# Patient Record
(demographics unavailable — no encounter records)

---

## 2024-10-21 NOTE — HISTORY OF PRESENT ILLNESS
[FreeTextEntry1] : Patient presents today for high-risk foot care. She has right sub 2 preulcerative lesion and worsening mycotic nails.  Principal Discharge DX:	Shoulder pain

## 2024-10-21 NOTE — PHYSICAL EXAM
[Delayed in the Right Toes] : capillary refills delayed in the right toes [Delayed in the Left Toes] : capillary refills delayed in the left toes [0] : left foot posterior tibialis 0 [2+] : left foot dorsalis pedis 2+ [Sensation] : the sensory exam was normal to light touch and pinprick [No Focal Deficits] : no focal deficits [Deep Tendon Reflexes (DTR)] : deep tendon reflexes were 2+ and symmetric [Motor Exam] : the motor exam was normal [FreeTextEntry3] : Absent hair growth. The patient has a class finding of Q8-Two Class B findings.  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 [FreeTextEntry1] : She has onychomycosis in nails 1, 2, 3, 4 and 5 on the right and 1 on the left. They are yellow, thick, brittle with subungual debris. Painful at the tops and tips of all nails. Left heel deep seated IPK. She has right sub 2 and 3 keratotic lesions. [Diminished Throughout Right Foot] : normal sensation with monofilament testing throughout right foot [Diminished Throughout Left Foot] : normal sensation with monofilament testing throughout left foot

## 2024-10-21 NOTE — HISTORY OF PRESENT ILLNESS
[FreeTextEntry1] : Patient presents today for high-risk foot care. She has right sub 2 preulcerative lesion and worsening mycotic nails.

## 2024-10-21 NOTE — ASSESSMENT
[FreeTextEntry1] : Impression: Generalized atherosclerosis. IPK. Onychomycosis. Pain.  Treatment: I manually and mechanically debrided mycotic nails x6 using a small straight nail splitter and rotary claribel without incident.  I trimmed keratotic lesions, after prepping with alcohol, without incidence.  I gave her the [employer] catalog in order to avoid further breakdown. Discussed shoe gear. She has a foot at risk due to poor circulation.  I encouraged her to walk. Follow-up in the office in 3 months. Call with any problems.

## 2024-10-21 NOTE — PHYSICAL EXAM
[Delayed in the Right Toes] : capillary refills delayed in the right toes [Delayed in the Left Toes] : capillary refills delayed in the left toes [0] : left foot posterior tibialis 0 [2+] : left foot dorsalis pedis 2+ [Sensation] : the sensory exam was normal to light touch and pinprick [No Focal Deficits] : no focal deficits [Deep Tendon Reflexes (DTR)] : deep tendon reflexes were 2+ and symmetric [Motor Exam] : the motor exam was normal [FreeTextEntry3] : Absent hair growth. The patient has a class finding of Q8-Two Class B findings.  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 [FreeTextEntry1] : She has onychomycosis in nails 1, 2, 3, 4 and 5 on the right and 1 on the left. They are yellow, thick, brittle with subungual debris. Painful at the tops and tips of all nails. Left heel deep seated IPK. She has right sub 2 and 3 keratotic lesions. [Diminished Throughout Right Foot] : normal sensation with monofilament testing throughout right foot [Diminished Throughout Left Foot] : normal sensation with monofilament testing throughout left foot

## 2024-10-21 NOTE — PHYSICAL EXAM
[Delayed in the Right Toes] : capillary refills delayed in the right toes [Delayed in the Left Toes] : capillary refills delayed in the left toes [0] : left foot posterior tibialis 0 [2+] : left foot dorsalis pedis 2+ [Sensation] : the sensory exam was normal to light touch and pinprick [No Focal Deficits] : no focal deficits [Deep Tendon Reflexes (DTR)] : deep tendon reflexes were 2+ and symmetric [Motor Exam] : the motor exam was normal [FreeTextEntry3] : Absent hair growth. The patient has a class finding of Q8-Two Class B findings.  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 [FreeTextEntry1] : She has onychomycosis in nails 1, 2, 3, 4 and 5 on the right and 1 on the left. They are yellow, thick, brittle with subungual debris. Painful at the tops and tips of all nails. Left heel deep seated IPK. She has right sub 2 and 3 keratotic lesions. [Diminished Throughout Right Foot] : normal sensation with monofilament testing throughout right foot [Diminished Throughout Left Foot] : normal sensation with monofilament testing throughout left foot

## 2025-01-17 NOTE — PHYSICAL EXAM
[Delayed in the Right Toes] : capillary refills delayed in the right toes [Delayed in the Left Toes] : capillary refills delayed in the left toes [0] : left foot posterior tibialis 0 [2+] : left foot dorsalis pedis 2+ [Sensation] : the sensory exam was normal to light touch and pinprick [No Focal Deficits] : no focal deficits [Deep Tendon Reflexes (DTR)] : deep tendon reflexes were 2+ and symmetric [Motor Exam] : the motor exam was normal [FreeTextEntry3] : Absent hair growth. The patient has a class finding of Q8-Two Class B findings.  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 Eunice. [FreeTextEntry1] : Interdigital tinea. Athletes' foot. Maceration and xerosis.  She has onychomycosis in nails 1, 2, 3, 4 and 5 on the right and 1 on the left. They are yellow, thick, brittle with subungual debris. Painful at the tops and tips of all nails.  [Diminished Throughout Right Foot] : normal sensation with monofilament testing throughout right foot [Diminished Throughout Left Foot] : normal sensation with monofilament testing throughout left foot

## 2025-01-17 NOTE — HISTORY OF PRESENT ILLNESS
[FreeTextEntry1] : Patient presents today with malodor and athlete's foot with itchiness in between the toes. She has onychomycotic nails. She has bunions that cause the toes to be tight.

## 2025-01-17 NOTE — PHYSICAL EXAM
[Delayed in the Right Toes] : capillary refills delayed in the right toes [Delayed in the Left Toes] : capillary refills delayed in the left toes [0] : left foot posterior tibialis 0 [2+] : left foot dorsalis pedis 2+ [Sensation] : the sensory exam was normal to light touch and pinprick [No Focal Deficits] : no focal deficits [Deep Tendon Reflexes (DTR)] : deep tendon reflexes were 2+ and symmetric [Motor Exam] : the motor exam was normal [FreeTextEntry3] : Absent hair growth. The patient has a class finding of Q8-Two Class B findings.  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 Eunice. [FreeTextEntry1] : Interdigital tinea. Athletes' foot. Maceration and xerosis.  She has onychomycosis in nails 1, 2, 3, 4 and 5 on the right and 1 on the left. They are yellow, thick, brittle with subungual debris. Painful at the tops and tips of all nails.  [Diminished Throughout Right Foot] : normal sensation with monofilament testing throughout right foot [Diminished Throughout Left Foot] : normal sensation with monofilament testing throughout left foot

## 2025-01-17 NOTE — PHYSICAL EXAM
[Delayed in the Right Toes] : capillary refills delayed in the right toes [Delayed in the Left Toes] : capillary refills delayed in the left toes [0] : left foot posterior tibialis 0 [2+] : left foot dorsalis pedis 2+ [Sensation] : the sensory exam was normal to light touch and pinprick [No Focal Deficits] : no focal deficits [Deep Tendon Reflexes (DTR)] : deep tendon reflexes were 2+ and symmetric [Motor Exam] : the motor exam was normal [FreeTextEntry3] : Absent hair growth. The patient has a class finding of Q8-Two Class B findings.  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 Eunice. [FreeTextEntry1] : Interdigital tinea. Athletes' foot. Maceration and xerosis.  She has onychomycosis in nails 1, 2, 3, 4 and 5 on the right and 1 on the left. They are yellow, thick, brittle with subungual debris. Painful at the tops and tips of all nails.  [Diminished Throughout Right Foot] : normal sensation with monofilament testing throughout right foot [Diminished Throughout Left Foot] : normal sensation with monofilament testing throughout left foot

## 2025-01-17 NOTE — ASSESSMENT
[FreeTextEntry1] : Impression:  Tinea pedis.  Treatment: I manually and mechanically debrided mycotic nails x6 using a small straight nail splitter and rotary claribel. The nails were aggressively debrided and debulked to make them comfortable in shoe gear. I debrided and cleansed the interspace. I applied Naftin gel and ePrescribed Lotrisone for her to use daily for the next 3 weeks in between the toes and on the nails. Call the office with any issues whatsoever.

## 2025-04-09 NOTE — PHYSICAL EXAM
[de-identified] : Severe hallux valgus bilaterally, worse on right side. Hallux with overlying 2nd digit right foot. Semi rigid hammer toes present bilaterally.  [FreeTextEntry1] : Lesion planyar submet 2 right foot, upon debridement no underlying wound noted. No clinical signs of infeciton. Nails are mycotic, thickened elongated and painful x10. Annular scaling lesions noted bilaterally.

## 2025-04-09 NOTE — HISTORY OF PRESENT ILLNESS
[FreeTextEntry1] : Patient presents today with malodor and athlete's foot with itchiness in between the toes. Patient also notes considearable pain from elongated nails as well as lesion on the bottom of her right foot. She was seen by Dr. Payan and prescribed Lotrisone which she states has helped.

## 2025-04-09 NOTE — ASSESSMENT
[FreeTextEntry1] : Patient seen and evaluated. Patient presents with tinea pedis as well as onychomycosis. Continue Lotrisone. Refill eRx.  OTC recommendation done as well. Patient will go to specialty shoe store for more comfortable shoe with high toe box. Aseptic debridement with sterile clippers and claribel of nails 1-10.  Debridement of right foot plantar lesion. All questions answered. She will follow up in3 months.

## 2025-06-20 NOTE — PHYSICAL EXAM
[Delayed in the Right Toes] : capillary refills delayed in the right toes [Delayed in the Left Toes] : capillary refills delayed in the left toes [0] : left foot posterior tibialis 0 [2+] : left foot dorsalis pedis 2+ [Sensation] : the sensory exam was normal to light touch and pinprick [No Focal Deficits] : no focal deficits [Deep Tendon Reflexes (DTR)] : deep tendon reflexes were 2+ and symmetric [Motor Exam] : the motor exam was normal [FreeTextEntry3] : Absent hair growth. The patient has a class finding of Q8-Two Class B findings.  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 [FreeTextEntry1] : Onychomycosis 1 through 5 right, 1 left. They are yellow, thick, brittle with subungual debris. Interdigital tinea. [Diminished Throughout Right Foot] : normal sensation with monofilament testing throughout right foot [Diminished Throughout Left Foot] : normal sensation with monofilament testing throughout left foot

## 2025-06-20 NOTE — PHYSICAL EXAM
[Delayed in the Right Toes] : capillary refills delayed in the right toes [Delayed in the Left Toes] : capillary refills delayed in the left toes [0] : left foot posterior tibialis 0 [2+] : left foot dorsalis pedis 2+ [Sensation] : the sensory exam was normal to light touch and pinprick [No Focal Deficits] : no focal deficits [Deep Tendon Reflexes (DTR)] : deep tendon reflexes were 2+ and symmetric [Motor Exam] : the motor exam was normal [FreeTextEntry3] : Absent hair growth. The patient has a class finding of Q8-Two Class B findings.  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 [FreeTextEntry1] : Onychomycosis 1 through 5 right, 1 left. They are yellow, thick, brittle with subungual debris. Interdigital tinea. [Diminished Throughout Right Foot] : normal sensation with monofilament testing throughout right foot [Diminished Throughout Left Foot] : normal sensation with monofilament testing throughout left foot

## 2025-06-20 NOTE — HISTORY OF PRESENT ILLNESS
[FreeTextEntry1] : Patient presents today for evaluation and care of painful deformed mycotic nails, right side worse than left. It is in all nails 1, 2, 3, 4 and 5 on the right and 1 on the left. She also has arthritic malposition of toes with interdigital tinea and a 2nd toe that is a crossover, over the hallux.

## 2025-06-20 NOTE — ASSESSMENT
[FreeTextEntry1] : Impression: Metatarsalgia. Crossover deformity. Primary osteoarthritis. Onychomycosis. Tinea pedis.  Treatment: I radically and aggressively manually and mechanically debrided mycotic nails using a small straight nail splitter and rotary claribel. The nails were aggressively debrided and debulked to make them comfortable in shoe gear. I cleansed the interspaces and debrided them. I encouraged her to use Lotrisone for the next 3 weeks. Regarding the 2nd hammertoe, I made her a special pad that will stabilize and keep the toe in position. I discussed its usage. She is going to use it daily. She will follow-up in the office. With continued pain that persists I will get x-rays.

## 2025-06-20 NOTE — PHYSICAL EXAM
[Delayed in the Right Toes] : capillary refills delayed in the right toes [Delayed in the Left Toes] : capillary refills delayed in the left toes [0] : left foot posterior tibialis 0 [2+] : left foot dorsalis pedis 2+ [Sensation] : the sensory exam was normal to light touch and pinprick [No Focal Deficits] : no focal deficits [Deep Tendon Reflexes (DTR)] : deep tendon reflexes were 2+ and symmetric [Motor Exam] : the motor exam was normal [FreeTextEntry3] : Absent hair growth. The patient has a class finding of Q8-Two Class B findings.  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 [FreeTextEntry1] : Onychomycosis 1 through 5 right, 1 left. They are yellow, thick, brittle with subungual debris. Interdigital tinea. [Diminished Throughout Right Foot] : normal sensation with monofilament testing throughout right foot [Diminished Throughout Left Foot] : normal sensation with monofilament testing throughout left foot